# Patient Record
Sex: FEMALE | Race: ASIAN
[De-identification: names, ages, dates, MRNs, and addresses within clinical notes are randomized per-mention and may not be internally consistent; named-entity substitution may affect disease eponyms.]

---

## 2020-02-21 ENCOUNTER — HOSPITAL ENCOUNTER (OUTPATIENT)
Dept: HOSPITAL 76 - DI.N | Age: 55
Discharge: HOME | End: 2020-02-21
Attending: GENERAL ACUTE CARE HOSPITAL
Payer: COMMERCIAL

## 2020-02-21 DIAGNOSIS — Z12.31: Primary | ICD-10-CM

## 2020-02-21 PROCEDURE — 77067 SCR MAMMO BI INCL CAD: CPT

## 2020-03-02 NOTE — MAMMOGRAPHY REPORT
Reason:  ROUTINE MAMMO

Procedure Date:  02/21/2020   

Accession Number:  [id number] / [id number]                    

Procedure:  MGN - Screening Mammo Dig Bilat CPT Code:  

 

***Final Report***

 

 

FULL RESULT:

 

 

EXAM: Screening Mammo Dig Bilat

 

DATE: 2/21/2020 12:17 PM

 

CLINICAL HISTORY:  Screening encounter.

 

TECHNIQUE: (B) - Bilateral  CC and MLO views were obtained.

 

COMPARISON: 4/9/2018 through 6/11/2012.

 

PARENCHYMAL PATTERN: (D) - The breast(s) demonstrate(s) heterogeneously 

dense fibroglandular parenchyma.

 

FINDINGS:

There are no suspicious masses, calcifications, or areas of distortion.

 

IMPRESSION: Negative examination. BI-RADS category 1.

 

RECOMMENDATION: (ANNUAL)  - Recommend routine annual screening 

mammography.

 

BI-RADS CATEGORY: (1) - Negative.

 

STANDARD QUALIFYING STATEMENTS:

1.  This examination was not reviewed with the aid of Computer-Aided 

Detection (CAD).

2.  A negative or benign  imaging report should not preclude biopsy if 

clinically suspicious findings are present.

3.  Dense breasts may obscure an underlying neoplasm.

4. This examination was reviewed without the aid of 3D breast imaging 

(tomosynthesis).

## 2022-05-03 ENCOUNTER — HOSPITAL ENCOUNTER (OUTPATIENT)
Dept: HOSPITAL 76 - DI.N | Age: 57
Discharge: HOME | End: 2022-05-03
Attending: GENERAL ACUTE CARE HOSPITAL
Payer: COMMERCIAL

## 2022-05-03 DIAGNOSIS — R92.8: ICD-10-CM

## 2022-05-03 DIAGNOSIS — Z12.31: Primary | ICD-10-CM

## 2022-05-06 NOTE — MAMMOGRAPHY REPORT
BILATERAL DIGITAL SCREENING MAMMOGRAM 3D/2D: 5/3/2022

 

CLINICAL: Routine screening.  

 

Comparison is made to exams dated:  2/21/2020 mammogram and 4/9/2018 mammogram - [facility].  There are scattered fibroglandular elements in both breasts.  

 

There is a new oval focal asymmetry in the left breast at 2 o'clock posterior depth.  

No other significant masses, calcifications, or other findings are seen in either breast.  

 

IMPRESSION: INCOMPLETE: NEEDS ADDITIONAL IMAGING EVALUATION

The new oval focal asymmetry in the left breast resembles a cyst and is indeterminate.  Additional vi
ews with possible ultrasound are recommended.  

 

 

 

This exam was interpreted at Station ID: [id number].  

 

NOTE: For mammograms, a report in lay terms will be sent to the patient. Approximately 15% of breast 
malignancies will not be visualized mammographically. In the management of a palpable breast mass, a 
negative mammogram must not discourage biopsy of a clinically suspicious lesion.

 

Electronically Signed By: Tj Rollins M.D.          

ar/penrad:5/3/2022 11:46:16  

 

 

 

ACR BI-RADS Category 0: Incomplete 3340F

PARENCHYMAL PATTERN: (A) - The breast(s) demonstrate(s) scattered fibroglandular densities.

BI-RADS CATEGORY: (0) - 0

Mammo and US

[id number]

Immediate follow-up

LATERALITY: (L)